# Patient Record
Sex: MALE | Race: WHITE | URBAN - METROPOLITAN AREA
[De-identification: names, ages, dates, MRNs, and addresses within clinical notes are randomized per-mention and may not be internally consistent; named-entity substitution may affect disease eponyms.]

---

## 2025-03-31 ENCOUNTER — OFFICE VISIT (OUTPATIENT)
Dept: URGENT CARE | Age: 65
End: 2025-03-31
Payer: MEDICARE

## 2025-03-31 VITALS
BODY MASS INDEX: 24.58 KG/M2 | DIASTOLIC BLOOD PRESSURE: 83 MMHG | OXYGEN SATURATION: 98 % | WEIGHT: 180 LBS | TEMPERATURE: 97.7 F | HEART RATE: 76 BPM | RESPIRATION RATE: 16 BRPM | SYSTOLIC BLOOD PRESSURE: 125 MMHG

## 2025-03-31 DIAGNOSIS — J01.40 ACUTE NON-RECURRENT PANSINUSITIS: Primary | ICD-10-CM

## 2025-03-31 PROCEDURE — 99214 OFFICE O/P EST MOD 30 MIN: CPT

## 2025-03-31 PROCEDURE — 1159F MED LIST DOCD IN RCRD: CPT

## 2025-03-31 PROCEDURE — 1036F TOBACCO NON-USER: CPT

## 2025-03-31 PROCEDURE — 1160F RVW MEDS BY RX/DR IN RCRD: CPT

## 2025-03-31 RX ORDER — FINASTERIDE 5 MG/1
5 TABLET, FILM COATED ORAL DAILY
COMMUNITY

## 2025-03-31 RX ORDER — OXYBUTYNIN CHLORIDE 5 MG/1
5 TABLET, EXTENDED RELEASE ORAL DAILY
COMMUNITY

## 2025-03-31 RX ORDER — TAMSULOSIN HYDROCHLORIDE 0.4 MG/1
0.4 CAPSULE ORAL DAILY
COMMUNITY

## 2025-03-31 RX ORDER — ATORVASTATIN CALCIUM 10 MG/1
10 TABLET, FILM COATED ORAL DAILY
COMMUNITY

## 2025-03-31 RX ORDER — DOXYCYCLINE 100 MG/1
100 CAPSULE ORAL 2 TIMES DAILY
Qty: 20 CAPSULE | Refills: 0 | Status: SHIPPED | OUTPATIENT
Start: 2025-03-31 | End: 2025-04-10

## 2025-03-31 RX ORDER — FEXOFENADINE HCL AND PSEUDOEPHEDRINE HCL 180; 240 MG/1; MG/1
1 TABLET, EXTENDED RELEASE ORAL DAILY
Qty: 7 TABLET | Refills: 0 | Status: SHIPPED | OUTPATIENT
Start: 2025-03-31 | End: 2025-04-07

## 2025-03-31 RX ORDER — FLUTICASONE PROPIONATE 50 MCG
1 SPRAY, SUSPENSION (ML) NASAL DAILY
Qty: 16 G | Refills: 0 | Status: SHIPPED | OUTPATIENT
Start: 2025-03-31 | End: 2026-03-31

## 2025-03-31 ASSESSMENT — ENCOUNTER SYMPTOMS
SORE THROAT: 0
SHORTNESS OF BREATH: 0
CHILLS: 0
ABDOMINAL PAIN: 0
LIGHT-HEADEDNESS: 0
SINUS PRESSURE: 1
WHEEZING: 0
SINUS COMPLAINT: 1
FATIGUE: 1
VOMITING: 0
RHINORRHEA: 1
HEADACHES: 1
FEVER: 0
MYALGIAS: 0
DIARRHEA: 0
LOSS OF CONSCIOUSNESS: 0
DIZZINESS: 0
COUGH: 1
NAUSEA: 0
SINUS PAIN: 1

## 2025-03-31 NOTE — PATIENT INSTRUCTIONS
"Home Care Instructions for Sinusitis  Medications: Take Flonase (nasal spray) and Claritin-D as directed to reduce congestion and inflammation.  Hydration: Drink plenty of fluids to help thin mucus and promote drainage.  Nasal Rinse: Use a saline nasal spray or neti pot to keep nasal passages clear.  Rest: Get adequate rest to help your body fight the infection.  Pain Management: Take Tylenol (acetaminophen) or ibuprofen as needed for pain or fever.  Steam Therapy: Use a humidifier or inhale steam from a bowl of hot water to ease sinus pressure.  \"Wait and See\" Antibiotic: If an antibiotic was prescribed but instructed to wait, only start it if symptoms persist beyond 10 days, worsen after initial improvement, or become severe (high fever, facial swelling, severe headache).  Red Flag Symptoms - Seek Medical Attention If:  Symptoms worsen despite treatment or persist beyond 10-14 days.  High fever (>=102°F or 38.9°C).  Facial swelling or severe sinus pain.  Vision changes or severe headache unresponsive to pain medications.  Stiff neck, confusion, or difficulty breathing.  Follow-Up: See your primary care provider if symptoms persist or worsen.    "

## 2025-03-31 NOTE — PROGRESS NOTES
Subjective   Patient ID: Zane Cedillo is a 64 y.o. male. They present today with a chief complaint of sinus pressure (Ears clogged, fatigue, x few weeks ).    History of Present Illness  64 year old male presents sinus congestion/pressure/pain, ears clogged, headache, slight cough. Symptoms started approximately 2 wks ago. Has been taking tylenol with relief of headache.       Sinus Problem  Severity:  Moderate  Onset quality:  Gradual  Duration:  2 weeks  Timing:  Constant  Progression:  Unchanged  Chronicity:  New  Relieved by:  Tylenol  Worsened by:  Position  Associated symptoms: congestion, cough, fatigue, headaches and rhinorrhea    Associated symptoms: no abdominal pain, no chest pain, no diarrhea, no ear pain, no fever, no loss of consciousness, no myalgias, no nausea, no rash, no shortness of breath, no sore throat, no vomiting and no wheezing        Past Medical History  Allergies as of 03/31/2025 - Reviewed 03/31/2025   Allergen Reaction Noted    Codeine Other 10/30/2013    Penicillins Hives 10/30/2013       (Not in a hospital admission)       No past medical history on file.    No past surgical history on file.     reports that he has never smoked. He has never used smokeless tobacco.    Review of Systems  Review of Systems   Constitutional:  Positive for fatigue. Negative for chills and fever.   HENT:  Positive for congestion, postnasal drip, rhinorrhea, sinus pressure and sinus pain. Negative for ear pain and sore throat.    Respiratory:  Positive for cough. Negative for shortness of breath and wheezing.    Cardiovascular:  Negative for chest pain.   Gastrointestinal:  Negative for abdominal pain, diarrhea, nausea and vomiting.   Musculoskeletal:  Negative for myalgias.   Skin:  Negative for rash.   Neurological:  Positive for headaches. Negative for dizziness, loss of consciousness and light-headedness.   All other systems reviewed and are negative.            Objective    Vitals:    03/31/25 0901   BP:  125/83   Pulse: 76   Resp: 16   Temp: 36.5 °C (97.7 °F)   SpO2: 98%   Weight: 81.6 kg (180 lb)     No LMP for male patient.    Physical Exam  Vitals and nursing note reviewed.   Constitutional:       General: He is not in acute distress.     Appearance: Normal appearance. He is not ill-appearing.   HENT:      Head: Normocephalic.      Right Ear: Tympanic membrane, ear canal and external ear normal.      Left Ear: Tympanic membrane, ear canal and external ear normal.      Nose: Congestion and rhinorrhea present.      Right Turbinates: Swollen.      Left Turbinates: Swollen.      Right Sinus: Maxillary sinus tenderness and frontal sinus tenderness present.      Left Sinus: Maxillary sinus tenderness and frontal sinus tenderness present.      Mouth/Throat:      Mouth: Mucous membranes are moist.      Pharynx: No oropharyngeal exudate or posterior oropharyngeal erythema.   Eyes:      Pupils: Pupils are equal, round, and reactive to light.   Cardiovascular:      Rate and Rhythm: Normal rate and regular rhythm.      Pulses: Normal pulses.      Heart sounds: Normal heart sounds. No murmur heard.  Pulmonary:      Effort: Pulmonary effort is normal. No respiratory distress.      Breath sounds: Normal breath sounds. No wheezing or rhonchi.   Musculoskeletal:         General: Normal range of motion.      Cervical back: Normal range of motion and neck supple.   Lymphadenopathy:      Cervical: No cervical adenopathy.   Skin:     General: Skin is warm and dry.   Neurological:      Mental Status: He is alert and oriented to person, place, and time.   Psychiatric:         Mood and Affect: Mood normal.         Behavior: Behavior normal.         Procedures    Point of Care Test & Imaging Results from this visit  No results found for this visit on 03/31/25.   Imaging  No results found.    Cardiology, Vascular, and Other Imaging  No other imaging results found for the past 2 days      Diagnostic study results (if any) were reviewed by  NATHALY Renteria.    Assessment/Plan   Allergies, medications, history, and pertinent labs/EKGs/Imaging reviewed by NATHALY Renteria.     Medical Decision Making  Based on history of persistent sinus symptoms, likely bacterial sinusitis. Will start on oral antibiotics today. Encouraged patient to continue symptomatic and supportive care measures. Advised to follow-up with primary care provider if symptoms fail to improve or return to clinic with any new or worsening symptoms. Patient verbalized understanding and agreeable with plan.      Orders and Diagnoses  There are no diagnoses linked to this encounter.    Medical Admin Record      Patient disposition: Home    Electronically signed by NATHALY Renteria  9:05 AM